# Patient Record
Sex: MALE | Race: WHITE | Employment: OTHER | URBAN - METROPOLITAN AREA
[De-identification: names, ages, dates, MRNs, and addresses within clinical notes are randomized per-mention and may not be internally consistent; named-entity substitution may affect disease eponyms.]

---

## 2022-03-03 RX ORDER — VALSARTAN AND HYDROCHLOROTHIAZIDE 80; 12.5 MG/1; MG/1
1 TABLET, FILM COATED ORAL DAILY
COMMUNITY

## 2022-03-03 NOTE — PROGRESS NOTES
Covid testing to be done @ 3/11 at [place]- pt will bring in [place]  If positive---Pt instructed to notify MD ASAP   If negative--pt was instructed to bring results DOP/DOSPreoperative Screening for Elective Surgery/Invasive Procedures While COVID-19 present in the community     1. Have you tested positive or have been told to self-isolate for COVID-19 like symptoms within the past 28 days?no  2. Do you currently have any of the following symptoms?no  ? Fever >100.0 F or 99.9 F in immunocompromised patients?n  ? New onset cough, shortness of breath or difficulty breathing?n  ? New onset sore throat, myalgia (muscle aches and pains), headache, loss of taste/smell or diarrhea?n  3. Have you had a potential exposure to COVID-19 within the past 14 days by:no  ? Close contact with a confirmed case?n  ? Close contact with a healthcare worker,  or essential infrastructure worker (grocery store, [employer], gas station, public utilities or transportation)?no  ? Do you reside in a congregate setting such as; skilled nursing facility, adult home, correctional facility, homeless shelter or other institutional setting?n  ? Have you had recent travel to a known COVID-19 hotspot?n     Indicate if the patient has a positive screen by answering yes to one or more of the above questions. If patient is unable to obtain a COVID test prior to DOS/DOP will need RAPID DOS. C-Difficile admission screening and protocol:     * Admitted with diarrhea?n     *Prior history of C-Diff. In last 3 months? n     *Antibiotic use in the past 6-8 weeks?n     *Prior hospitalization or nursing home in the last month? n    SAFET[facility] FIRST. .call before you fall  [facility] [facility] time_____920__        Surgery time____1050___    Take the following medications with a sip of water:    Do not eat or drink anything after 12:00 midnight prior to your surgery.   This includes water chewing gum, mints and ice chips.   You may brush your teeth and gargle the morning of your surgery, but do not swallow the water     Please see your family doctor/pediatrician for a history and physical and/or concerning medications. Bring any test results/reports from your physicians office. If you are under the care of a heart doctor or specialist doctor, please be aware that you may be asked to them for clearance    You may be asked to stop blood thinners such as Coumadin, Plavix, Fragmin, Lovenox, etc., or any anti-inflammatories such as:  Aspirin, Ibuprofen, Advil, Naproxen prior to your surgery. We also ask that you stop any OTC medications such as fish oil, vitamin E, glucosamine, garlic, Multivitamins, COQ 10, etc.    We ask that you do not smoke 24 hours prior to surgery  We ask that you do not  drink any alcoholic beverages 24 hours prior to surgery     You must make arrangements for a responsible adult to take you home after your surgery. For your safety you will not be allowed to leave alone or drive yourself home. Your surgery will be cancelled if you do not have a ride home. Also for your safety, it is strongly suggested that someone stay with you the first 24 hours after your surgery. A parent or legal guardian must accompany a child scheduled for surgery and plan to stay at the hospital until the child is discharged. Please do not bring other children with you. For your comfort, please wear simple loose fitting clothing to the hospital.  Please do not bring valuables. Do not wear any make-up or nail polish on your fingers or toes      For your safety, please do not wear any jewelry or body piercing's on the day of surgery. All jewelry must be removed. If you have dentures, they will be removed before going to operating room. For your convenience, we will provide you with a container. If you wear contact lenses or glasses, they will be removed, please bring a case for them.      If you have a living will and a durable power of  for healthcare, please bring in a copy. As part of our patient safety program to minimize surgical site infections, we ask you to do the following:    · Please notify your surgeon if you develop any illness between         now and the  day of your surgery. · This includes a cough, cold, fever, sore throat, nausea,         or vomiting, and diarrhea, etc.  ·  Please notify your surgeon if you experience dizziness, shortness         of breath or blurred vision between now and the time of your surgery. Do not shave your operative site 96 hours prior to surgery. For face and neck surgery, men may use an electric razor 48 hours   prior to surgery. You may shower the night before surgery or the morning of   your surgery with an antibacterial soap. You will need to bring a photo ID and insurance card    [facility] has an onsite pharmacy, would you like to utilize our pharmacy     If you will be staying overnight and use a C-pap machine, please bring   your C-pap to hospital     Our goal is to provide you with excellent care, therefore, visitors will be limited to two(2) in the room at a time so that we may focus on providing this care for you. Please contact pre-admission testing if you have any further questions. [facility] phone number:  [phone] PAT fax number:  [phone]  Please note these are generalized instructions for all surgical cases, you may be provided with more specific instructions according to your surgery.

## 2022-03-15 ENCOUNTER — ANESTHESIA EVENT (OUTPATIENT)
Dept: OPERATING ROOM | Age: 82
End: 2022-03-15
Payer: MEDICARE

## 2022-03-16 ENCOUNTER — HOSPITAL ENCOUNTER (OUTPATIENT)
Age: 82
Setting detail: OUTPATIENT SURGERY
Discharge: HOME | End: 2022-03-16
Attending: FAMILY MEDICINE | Admitting: FAMILY MEDICINE
Payer: MEDICARE

## 2022-03-16 ENCOUNTER — ANESTHESIA (OUTPATIENT)
Dept: OPERATING ROOM | Age: 82
End: 2022-03-16
Payer: MEDICARE

## 2022-03-16 VITALS
OXYGEN SATURATION: 95 % | DIASTOLIC BLOOD PRESSURE: 61 MMHG | RESPIRATION RATE: 1 BRPM | SYSTOLIC BLOOD PRESSURE: 121 MMHG

## 2022-03-16 VITALS
HEART RATE: 59 BPM | BODY MASS INDEX: 35.73 KG/M2 | HEIGHT: 76 IN | TEMPERATURE: 97.7 F | OXYGEN SATURATION: 98 % | SYSTOLIC BLOOD PRESSURE: 134 MMHG | RESPIRATION RATE: 16 BRPM | DIASTOLIC BLOOD PRESSURE: 72 MMHG | WEIGHT: 293.43 LBS

## 2022-03-16 DIAGNOSIS — C44.212: ICD-10-CM

## 2022-03-16 DIAGNOSIS — G89.18 POST-OPERATIVE PAIN: Primary | ICD-10-CM

## 2022-03-16 PROCEDURE — 6370000000 HC RX 637 (ALT 250 FOR IP): Performed by: FAMILY MEDICINE

## 2022-03-16 PROCEDURE — A4217 STERILE WATER/SALINE, 500 ML: HCPCS | Performed by: FAMILY MEDICINE

## 2022-03-16 PROCEDURE — 7100000000 HC PACU RECOVERY - FIRST 15 MIN: Performed by: FAMILY MEDICINE

## 2022-03-16 PROCEDURE — 2709999900 HC NON-CHARGEABLE SUPPLY: Performed by: FAMILY MEDICINE

## 2022-03-16 PROCEDURE — 88331 PATH CONSLTJ SURG 1 BLK 1SPC: CPT

## 2022-03-16 PROCEDURE — 2500000003 HC RX 250 WO HCPCS: Performed by: NURSE ANESTHETIST, CERTIFIED REGISTERED

## 2022-03-16 PROCEDURE — 7100000011 HC PHASE II RECOVERY - ADDTL 15 MIN: Performed by: FAMILY MEDICINE

## 2022-03-16 PROCEDURE — 3600000002 HC SURGERY LEVEL 2 BASE: Performed by: FAMILY MEDICINE

## 2022-03-16 PROCEDURE — 2580000003 HC RX 258: Performed by: FAMILY MEDICINE

## 2022-03-16 PROCEDURE — 88305 TISSUE EXAM BY PATHOLOGIST: CPT

## 2022-03-16 PROCEDURE — 3700000000 HC ANESTHESIA ATTENDED CARE: Performed by: FAMILY MEDICINE

## 2022-03-16 PROCEDURE — 2580000003 HC RX 258: Performed by: NURSE ANESTHETIST, CERTIFIED REGISTERED

## 2022-03-16 PROCEDURE — 7100000001 HC PACU RECOVERY - ADDTL 15 MIN: Performed by: FAMILY MEDICINE

## 2022-03-16 PROCEDURE — 3700000001 HC ADD 15 MINUTES (ANESTHESIA): Performed by: FAMILY MEDICINE

## 2022-03-16 PROCEDURE — 2580000003 HC RX 258: Performed by: ANESTHESIOLOGY

## 2022-03-16 PROCEDURE — 6360000002 HC RX W HCPCS: Performed by: NURSE ANESTHETIST, CERTIFIED REGISTERED

## 2022-03-16 PROCEDURE — 2500000003 HC RX 250 WO HCPCS: Performed by: FAMILY MEDICINE

## 2022-03-16 PROCEDURE — 7100000010 HC PHASE II RECOVERY - FIRST 15 MIN: Performed by: FAMILY MEDICINE

## 2022-03-16 PROCEDURE — 3600000012 HC SURGERY LEVEL 2 ADDTL 15MIN: Performed by: FAMILY MEDICINE

## 2022-03-16 RX ORDER — SODIUM CHLORIDE 9 MG/ML
INJECTION, SOLUTION INTRAVENOUS CONTINUOUS PRN
Status: DISCONTINUED | OUTPATIENT
Start: 2022-03-16 | End: 2022-03-16 | Stop reason: SDUPTHER

## 2022-03-16 RX ORDER — MAGNESIUM HYDROXIDE 1200 MG/15ML
LIQUID ORAL CONTINUOUS PRN
Status: COMPLETED | OUTPATIENT
Start: 2022-03-16 | End: 2022-03-16

## 2022-03-16 RX ORDER — SODIUM CHLORIDE 0.9 % (FLUSH) 0.9 %
5-40 SYRINGE (ML) INJECTION EVERY 12 HOURS SCHEDULED
Status: DISCONTINUED | OUTPATIENT
Start: 2022-03-16 | End: 2022-03-16 | Stop reason: HOSPADM

## 2022-03-16 RX ORDER — ACETAMINOPHEN AND CODEINE PHOSPHATE 300; 30 MG/1; MG/1
1 TABLET ORAL EVERY 6 HOURS PRN
Qty: 5 TABLET | Refills: 0 | Status: SHIPPED | OUTPATIENT
Start: 2022-03-16 | End: 2022-03-18

## 2022-03-16 RX ORDER — ONDANSETRON 2 MG/ML
4 INJECTION INTRAMUSCULAR; INTRAVENOUS
Status: DISCONTINUED | OUTPATIENT
Start: 2022-03-16 | End: 2022-03-16 | Stop reason: HOSPADM

## 2022-03-16 RX ORDER — SODIUM CHLORIDE 0.9 % (FLUSH) 0.9 %
5-40 SYRINGE (ML) INJECTION PRN
Status: DISCONTINUED | OUTPATIENT
Start: 2022-03-16 | End: 2022-03-16 | Stop reason: HOSPADM

## 2022-03-16 RX ORDER — MEPERIDINE HYDROCHLORIDE 25 MG/ML
12.5 INJECTION INTRAMUSCULAR; INTRAVENOUS; SUBCUTANEOUS EVERY 5 MIN PRN
Status: DISCONTINUED | OUTPATIENT
Start: 2022-03-16 | End: 2022-03-16 | Stop reason: HOSPADM

## 2022-03-16 RX ORDER — LIDOCAINE HYDROCHLORIDE 20 MG/ML
INJECTION, SOLUTION EPIDURAL; INFILTRATION; INTRACAUDAL; PERINEURAL PRN
Status: DISCONTINUED | OUTPATIENT
Start: 2022-03-16 | End: 2022-03-16 | Stop reason: SDUPTHER

## 2022-03-16 RX ORDER — SODIUM CHLORIDE 9 MG/ML
25 INJECTION, SOLUTION INTRAVENOUS PRN
Status: DISCONTINUED | OUTPATIENT
Start: 2022-03-16 | End: 2022-03-16 | Stop reason: HOSPADM

## 2022-03-16 RX ORDER — PROPOFOL 10 MG/ML
INJECTION, EMULSION INTRAVENOUS CONTINUOUS PRN
Status: DISCONTINUED | OUTPATIENT
Start: 2022-03-16 | End: 2022-03-16 | Stop reason: SDUPTHER

## 2022-03-16 RX ORDER — PROPOFOL 10 MG/ML
INJECTION, EMULSION INTRAVENOUS PRN
Status: DISCONTINUED | OUTPATIENT
Start: 2022-03-16 | End: 2022-03-16 | Stop reason: SDUPTHER

## 2022-03-16 RX ORDER — ACETAMINOPHEN 500 MG
1000 TABLET ORAL ONCE
Status: COMPLETED | OUTPATIENT
Start: 2022-03-16 | End: 2022-03-16

## 2022-03-16 RX ORDER — BACITRACIN ZINC AND POLYMYXIN B SULFATE 500; 1000 [USP'U]/G; [USP'U]/G
OINTMENT TOPICAL
Status: COMPLETED | OUTPATIENT
Start: 2022-03-16 | End: 2022-03-16

## 2022-03-16 RX ORDER — SODIUM CHLORIDE 9 MG/ML
INJECTION, SOLUTION INTRAVENOUS CONTINUOUS
Status: DISCONTINUED | OUTPATIENT
Start: 2022-03-16 | End: 2022-03-16 | Stop reason: HOSPADM

## 2022-03-16 RX ORDER — PHENYLEPHRINE HCL IN 0.9% NACL 1 MG/10 ML
SYRINGE (ML) INTRAVENOUS PRN
Status: DISCONTINUED | OUTPATIENT
Start: 2022-03-16 | End: 2022-03-16 | Stop reason: SDUPTHER

## 2022-03-16 RX ADMIN — ACETAMINOPHEN 1000 MG: 500 TABLET ORAL at 10:22

## 2022-03-16 RX ADMIN — Medication 100 MCG: at 11:23

## 2022-03-16 RX ADMIN — SODIUM CHLORIDE: 9 INJECTION, SOLUTION INTRAVENOUS at 10:22

## 2022-03-16 RX ADMIN — PROPOFOL 100 MG: 10 INJECTION, EMULSION INTRAVENOUS at 10:40

## 2022-03-16 RX ADMIN — SODIUM CHLORIDE: 9 INJECTION, SOLUTION INTRAVENOUS at 10:33

## 2022-03-16 RX ADMIN — PROPOFOL 50 MG: 10 INJECTION, EMULSION INTRAVENOUS at 10:47

## 2022-03-16 RX ADMIN — Medication 100 MCG: at 11:12

## 2022-03-16 RX ADMIN — PROPOFOL 75 MCG/KG/MIN: 10 INJECTION, EMULSION INTRAVENOUS at 10:55

## 2022-03-16 RX ADMIN — LIDOCAINE HYDROCHLORIDE 100 MG: 20 INJECTION, SOLUTION EPIDURAL; INFILTRATION; INTRACAUDAL; PERINEURAL at 10:40

## 2022-03-16 ASSESSMENT — PULMONARY FUNCTION TESTS
PIF_VALUE: 0
PIF_VALUE: 1
PIF_VALUE: 1
PIF_VALUE: 0
PIF_VALUE: 0
PIF_VALUE: 1
PIF_VALUE: 0
PIF_VALUE: 1
PIF_VALUE: 0
PIF_VALUE: 0
PIF_VALUE: 1
PIF_VALUE: 0
PIF_VALUE: 1
PIF_VALUE: 0
PIF_VALUE: 1
PIF_VALUE: 1
PIF_VALUE: 0
PIF_VALUE: 1
PIF_VALUE: 0

## 2022-03-16 ASSESSMENT — PAIN SCALES - GENERAL
PAINLEVEL_OUTOF10: 0

## 2022-03-16 ASSESSMENT — ENCOUNTER SYMPTOMS: SHORTNESS OF BREATH: 0

## 2022-03-16 ASSESSMENT — PAIN - FUNCTIONAL ASSESSMENT: PAIN_FUNCTIONAL_ASSESSMENT: 0-10

## 2022-03-16 NOTE — H&P
Plastic Surgery Consult    Patient: Felipe An MRN: [id number]     YOB: 1940  Age: 78 y.o. Sex: male    Unit: [facility] OR Room/Bed: OR/NONE Location: [address] [place] [address]     Admitting Physician: Aminta Jackson     Primary Care Physician: Diane Garza DO, DO      Chief Complaint/HPI:   Biopsy proven basal cell skin cancer Right ear. Date of Consultation: 3/16/2022    Subjective:      Felipe An is a 78 y.o. male who was referred for evaluation and treatment of biopsy proven basal cell skin cancer Right ear. Patient Active Problem List    Diagnosis Date Noted    Chest pain 06/14/2014    Constipation 06/14/2014    Hypercholesterolemia 06/14/2014    Atypical chest pain 06/14/2014    Bradycardia, sinus 06/14/2014     History reviewed. No pertinent family history. Social History     Tobacco Use    Smoking status: Never Smoker    Smokeless tobacco: Never Used   Substance Use Topics    Alcohol use: Not Currently     Past Medical History:   Diagnosis Date    Cancer ([facility] [address]     skin and prostate    High cholesterol     [place] (hard of hearing)     bilateral hearing aids    Hypertension       Past Surgical History:   Procedure Laterality Date    EYE SURGERY      PROSTATE SURGERY        Prior to Admission medications    Medication Sig Start Date End Date Taking? Authorizing Provider   valsartan-hydroCHLOROthiazide (DIOVAN HCT) 80-12.5 MG per tablet Take 1 tablet by mouth daily   Yes Historical Provider, MD   aspirin 81 MG EC tablet Take 1 tablet by mouth daily. 6/15/14  Yes Sammy Benjamin MD   atorvastatin (LIPITOR) 10 MG tablet Take 10 mg by mouth daily. Yes Historical Provider, MD     No Known Allergies     Review of Systems:   Pertinent items are noted in HPI. Objective:       Vital signs shows that the patient is afebrile and his vital signs are stable.      Scheduled Meds:   acetaminophen  1,000 mg Oral Once    sodium chloride flush  5-40 mL IntraVENous 2 times per day    sodium chloride flush  5-40 mL IntraVENous 2 times per day     Continuous Infusions:   sodium chloride      sodium chloride      sodium chloride       PRN Meds:sodium chloride flush, sodium chloride, sodium chloride flush, sodium chloride, meperidine, HYDROmorphone, HYDROmorphone, ondansetron          Physical Exam:   Constitutional: alert, no acute distress, well hydrated, well developed, well nourished. well groomed appropriate for age  Skin: normal color, no rashes, no unusual bruising. Palpation of scalp skin and inspection of body hair show no clinically significant lesions. Inspection and/or palpation of skin and subcutaneous tissues of head shows biopsy proven basal cell skin cancer Right ear, otherwise no clinically significant lesions. Inspection and/or or palpation of skin and subcutaneous tissues of neck shows no clinically significant lesions. Inspection and/or palpation of skin and subcutaneous tissues of abdomen shows no clinically significant lesions. Inspection and/or palpation of skin and subcutaneous tissues of genitalia, groin, and buttocks declined by patient. Inspection and/or palpation of skin and subcutaneous tissues of back shows no clinically significant lesions. Inspection and/or palpation of skin and subcutaneous tissues of chest shows no clinically significant lesions. Inspection and/or palpation of skin and subcutaneous tissues of RIGHT upper extremity shows no clinically significant lesions. Inspection and/orpalpation of skin and subcutaneous tissues of LEFT upper extremity shows no clinically significant lesions. Inspection and/or palpation of skin and subcutaneous tissues of RIGHT lower extremity shows no clinically significant lesions. Inspection and/or palpation of skin and subcutaneous tissues of LEFT lower extremity shows no clinically significant lesions.   Apocrine and eccrine sweat gland regions normal with no evidence of hyperhidrosis, chromhidrosis, nor bromhidrosis. No lymphadenopathy in bilateral cervical nor axillary lymph node basins. Skin normal turgor, normal capillary refill, and warm to touch. No cyanosis, clubbing, inflammation,  ischemia, infections, nodes, nor petechiae in digits nor nail beds in four extremties. Eyes: pupils equal, no injection, no icterus. conjunctiva are moist and clear bilaterally, normal lid motility bilaterally, extraoccular muscles intact bilaterally, pupils equal, round & reactive to light bilaterally, normal light reaction, and vision grossly normal  Ears/Nose/Throat: external ears normal, hearing normal, external nose normal, tongue normal. maxillary and mandibular teeth normal upper and lower lips normal maxillary and mandibular gums normal mucous membranes normal hard and soft palates normal tonsils normal posterior pharynx normal.  Neck: Supple, no adenopathy, no masses, no thyromegaly, no thyroid tenderness, nor nodules. Chest: normal chest inspection. Peripheral edema is present in the bilateral lower extremities. Varicose veins in bilateral legs. Pulses intact in four extremities. Skin warm. Abdomen: Anal and genitourinary exams declined by patient. No masses nor organomegaly. Neuro: cranial nerves grossly intact, sensation intact bilaterally. Psych: affect and mood appropriate. normal interaction. oriented to person, place time, and circumstance good eye contact. Assessment:     Active Problems:    * No active hospital problems. *  Resolved Problems:    * No resolved hospital problems. *        Plan:     Excision of biopsy proven basal cell skin cancer Right ear, out-patient, [facility], [facility]  Informed operative consent obtained. Follow up in my office one week post-op. Medical Decision Making:  High in severity, high complexity, with decision for surgical versus non-surgical therapy and anesthetic considerations. Records were reviewed.  Morbidity and mortality risk is high severity. Counseling: Diagnostic results show prognosis is good. Instructions for Management: Management of chronic diseases by [facility]  Excision of biopsy proven basal cell skin cancer Right ear, out-patient, [facility]est[facility], [facility]  Informed operative consent obtained. Follow up in my office one week post-op. Nature of Presenting Problem: High severity.           Tanmay Cisneros MD                        Signed By: Tanmay Cisneros MD     March 16, 2022

## 2022-03-16 NOTE — PROGRESS NOTES
Patient admitted to PACU # 4 from OR at 1210 post WEDGE RESECTION BIOPSY PROVEN BASAL CELL SKIN CANCER RIGHT EAR - Right per Dr. Author Waters. Attached to PACU monitoring system and report received from anesthesia provider. Patient was reported to be hemodynamically stable during procedure. Patient drowsy on admission and denied pain.       Electronically signed by Diaz Hurt RN on 3/16/2022 at 12:18 PM

## 2022-03-16 NOTE — ANESTHESIA PRE PROCEDURE
[facility] Department of Anesthesiology  Pre-Anesthesia Evaluation/Consultation       Name:  Venu Gerard  : 1940  Age:  78 y.o. MRN:  [id number]  Date: 3/16/2022           Surgeon: Surgeon(s):  Juan Carlos Lund MD    Procedure: Procedure(s):  WEDGE RESECTION BIOPSY PROVEN BASAL CELL SKIN CANCER RIGHT EAR     No Known Allergies  Patient Active Problem List   Diagnosis    Chest pain    Constipation    Hypercholesterolemia    Atypical chest pain    Bradycardia, sinus     Past Medical History:   Diagnosis Date    Cancer ([facility] [address]     skin and prostate    High cholesterol     [place] (hard of hearing)     bilateral hearing aids    Hypertension      Past Surgical History:   Procedure Laterality Date    EYE SURGERY      PROSTATE SURGERY       Social History     Tobacco Use    Smoking status: Never Smoker    Smokeless tobacco: Never Used   Vaping Use    Vaping Use: Never used   Substance Use Topics    Alcohol use: Not Currently    Drug use: Not Currently     Medications  No current facility-administered medications on file prior to encounter. Current Outpatient Medications on File Prior to Encounter   Medication Sig Dispense Refill    valsartan-hydroCHLOROthiazide (DIOVAN HCT) 80-12.5 MG per tablet Take 1 tablet by mouth daily      aspirin 81 MG EC tablet Take 1 tablet by mouth daily. 30 tablet 3    atorvastatin (LIPITOR) 10 MG tablet Take 10 mg by mouth daily.        Current Facility-Administered Medications   Medication Dose Route Frequency Provider Last Rate Last Admin    acetaminophen (TYLENOL) tablet 1,000 mg  1,000 mg Oral Once Rank Hayden Morris MD        0.9 % sodium chloride infusion   IntraVENous Continuous Tanmay Irby MD        sodium chloride flush 0.9 % injection 5-40 mL  5-40 mL IntraVENous 2 times per day Tanmay Irby MD        sodium chloride flush 0.9 % injection 5-40 mL  5-40 mL IntraVENous PRN Tanmay Irby MD  0.9 % sodium chloride infusion  25 mL IntraVENous PRN Pritesh Hall MD         Vital Signs (Current)   Vitals:    22 0928 22   BP:  132/67   Pulse:  69   Resp:  16   Temp:  97.9 °F (36.6 °C)   TempSrc:  Temporal   SpO2:  99%   Weight: 271 lb (122.9 kg) 293 lb 6.9 oz (133.1 kg)   Height: 6' 4\" (1.93 m) 6' 4\" (1.93 m)                                            Vital Signs Statistics (for past 48 hrs)     Temp  Av.9 °F (36.6 °C)  Min: 97.9 °F (36.6 °C)   Min taken time: 22  Max: 97.9 °F (36.6 °C)   Max taken time: 22  Pulse  Av  Min: 71   Min taken time: 22  Max: 71   Max taken time: 22  Resp  Av  Min: 12   Min taken time: 22  Max: 12   Max taken time: 22  BP  Min: 132/67   Min taken time: 22  Max: 132/67   Max taken time: 22  SpO2  Av %  Min: 99 %   Min taken time: 22  Max: 99 %   Max taken time: 22  BP Readings from Last 3 Encounters:   22 132/67       BMI  Body mass index is 35.72 kg/m². Estimated body mass index is 35.72 kg/m² as calculated from the following:    Height as of this encounter: 6' 4\" (1.93 m). Weight as of this encounter: 293 lb 6.9 oz (133.1 kg).     CBC   Lab Results   Component Value Date    WBC 5.1 2014    RBC 4.37 2014    HGB 13.9 2014    HCT 40.5 2014    MCV 92.5 2014    RDW 13.6 2014     2014     CMP    Lab Results   Component Value Date     2014    K 4.2 2014     2014    CO2 27 2014    BUN 16 2014    CREATININE 0.7 2014    GFRAA >60 2014    LABGLOM >60 2014    GLUCOSE 112 2014    CALCIUM 9.1 2014     BMP    Lab Results   Component Value Date     2014    K 4.2 2014     2014    CO2 27 2014    BUN 16 2014    CREATININE 0.7 2014    CALCIUM 9.1 2014    GFRAA >60 06/14/2014    LABGLOM >60 06/14/2014    GLUCOSE 112 06/14/2014     POCGlucose  No results for input(s): GLUCOSE in the last 72 hours. [facility]    Lab Results   Component Value Date    PROTIME 10.3 06/14/2014    INR 0.92 06/14/2014    APTT 30.9 52/75/5766     HCG (If Applicable) No results found for: PREGTESTUR, PREGSERUM, HCG, HCGQUANT   ABGs No results found for: PHART, PO2ART, MYQ8HQI, UNA4SBO, BEART, X6IFZGUI   Type & Screen (If Applicable)  No results found for: LABABO, LABRH                         BMI: Wt Readings from Last 3 Encounters:       NPO Status:   Date of last liquid consumption: 03/15/22   Time of last liquid consumption: 2300   Date of last solid food consumption: 03/15/22      Time of last solid consumption: 2000       Anesthesia Evaluation  Patient summary reviewed and Nursing notes reviewed  Airway: Mallampati: III  TM distance: >3 FB   Neck ROM: full  Mouth opening: > = 3 FB Dental:          Pulmonary:       (-) COPD, asthma and shortness of breath                           Cardiovascular:    (+) hypertension:, hyperlipidemia    (-) valvular problems/murmurs, past MI, CAD, CABG/stent, dysrhythmias and  angina                Neuro/Psych:      (-) seizures, TIA and CVA           GI/Hepatic/Renal:        (-) GERD, PUD, liver disease and no renal disease       Endo/Other:    (+) malignancy/cancer (prostate, skin). (-) diabetes mellitus               Abdominal:             Vascular: negative vascular ROS. Other Findings:             Anesthesia Plan      MAC     ASA 2     (I discussed intravenous sedation to the patient's satisfaction including risks and alternatives. The patient agreed with the plan and has no further questions. Mai Watson MD )  Induction: intravenous. Anesthetic plan and risks discussed with patient. Plan discussed with CRNA.                   This pre-anesthesia assessment may be used as a history and physical.    DOS STAFF ADDENDUM:    Pt seen and examined, chart reviewed (including anesthesia, drug and allergy history). No interval changes to history and physical examination. Anesthetic plan, risks, benefits, alternatives, and personnel involved discussed with patient. Patient verbalized an understanding and agrees to proceed.       Huey Anne MD  March 16, 2022  10:06 AM

## 2022-03-16 NOTE — PROGRESS NOTES
PACU Transfer Note    Vitals:    03/16/22 1245   BP: 134/72   Pulse: 55   Resp: 13   Temp: 97.7 °F (36.5 °C)   SpO2: 97%       No intake/output data recorded.     Pain assessment:  none  Pain Level: 0    Report given to Receiving unit RN.    3/16/2022 12:46 PM

## 2022-03-16 NOTE — ANESTHESIA POSTPROCEDURE EVALUATION
[facility] Department of Anesthesiology  Post-Anesthesia Note       Name:  Ruben Tavares                                  Age:  78 y.o. MRN:  [id number]     Last Vitals & Oxygen Saturation: /72   Pulse 59   Temp 97.7 °F (36.5 °C) (Temporal)   Resp 16   Ht 6' 4\" (1.93 m)   Wt 293 lb 6.9 oz (133.1 kg)   SpO2 98%   BMI 35.72 kg/m²   Patient Vitals for the past 4 hrs:   BP Temp Temp src Pulse Resp SpO2   03/16/22 1259 134/72 97.7 °F (36.5 °C) Temporal 59 16 98 %   03/16/22 1245 134/72 97.7 °F (36.5 °C)  55 13 97 %   03/16/22 1230 134/71   53 11 97 %   03/16/22 1225 120/67   60 15 95 %   03/16/22 1220 131/76   63 14 95 %   03/16/22 1215 127/73   64 15 94 %   03/16/22 1214    62     03/16/22 1211 116/62 97.5 °F (36.4 °C) Tympanic 64 14        Level of consciousness:  Awake, alert    Respiratory: Respirations easy, no distress. Stable. Cardiovascular: Hemodynamically stable. Hydration: Adequate. PONV: Adequately managed. Post-op pain: Adequately controlled. Post-op assessment: Tolerated anesthetic well without complication. Complications:  None.     Susana Garg MD  March 16, 2022   3:00 PM

## 2022-03-16 NOTE — H&P
Date of Surgery Update:  Marissa Jasso was seen, history and physical examination reviewed, and patient examined by me today. Site for surgery is marked, including my initials, NOAH. There have been no significant clinical changes since the completion of the above history and physical. The risk, benefits, and alternatives of the proposed procedure have been explained to the patient (or appropriate guardian) and understanding verbalized. All questions answered. Patient wishes to proceed.       Signed By: Lynne Castelan MD     March 16, 2022 10:18 AM

## 2022-03-16 NOTE — BRIEF OP NOTE
Brief Postoperative Note      Patient: Shad Patino  Date of Birth: [birth year]  MRN: [id number]    Date of Procedure: 3/16/2022    Pre-Op Diagnosis: BIOPSY PROVEN BASAL CELL SKIN CANCER RIGHT EAR2    Post-Op Diagnosis: Same       Procedure(s):  WEDGE RESECTION BIOPSY PROVEN BASAL CELL SKIN CANCER RIGHT EAR , 3.5 cm  Surgeon(s):  Cal Abreu MD    Assistant:  Surgical Assistant: Lukasz Crane    Anesthesia: Monitor Anesthesia Care    Estimated Blood Loss (mL): les than 10 ml    Complications: None    Specimens:   ID Type Source Tests Collected by Time Destination   A : A) PROVEN BASAL CELL SKIN CANCER RIGHT EAR  Specimen Ear SURGICAL PATHOLOGY Cal Abreu MD 3/16/2022 1020        Implants:  * No implants in log *      Drains: * No LDAs found *    Findings: as above    Electronically signed by Yana Middleton MD on 3/16/2022 at 12:04 PM

## 2022-03-17 NOTE — OP NOTE
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 with  frozen section and pathology; description of the risks and benefits of  the planned surgery and anesthesia including, but not limited to,  anaphylactic shock; administration of blood and/or blood products with  risk of blood-borne diseases; hypotension; injury to adjacent vital  structures including numbness of the skin in the area of the incision;  deep venous thrombosis of the legs with increased risk due to  hypertension, hyperlipidemia, and aspirin therapy; pulmonary emboli with  increased risk due to the same list of comorbidities; partial versus  complete graft or flap loss with increased risk due to the same list of  comorbidities; heart attack with increased risk due to the same list of  comorbidities; coma with increased risk due to the same list of  comorbidities; stroke with increased risk due to the same list of  comorbidities; paralysis with increased risk due to the same list of  comorbidities; weakness with increased risk due to the same list of  comorbidities; infection with increased risk due to the same list of  comorbidities; bleeding with increased risk due to the same list of  comorbidities; hematoma formation with increased risk due to the same  list of comorbidities; wound dehiscence with increased risk due to the  same list of comorbidities; death with increased risk due to the same  list of comorbidities; subtle asymmetry; hepatitis; renal failure; lung  failure; scar hypertrophy; keloid formation; fibrosis; pain for which  medication is available; risks associated with the use of a tourniquet.    All of the above risks are increased by poor health circumstances such  as obesity, malnutrition, smoking, and other forms of tobacco use;  description of alternatives to the planned surgery and anesthesia  including, but not limited to, local infiltration alone, sedation alone,  regional block with tumescent anesthesia, and general anesthesia. Surgical alternatives include radiation therapy, Mohs' chemographic  surgery, multiple excision options; description of the consequences of  no treatment to the problem including, but not limited to, cancer will  grow, spread, invade, and kill the patient; and statement as to the  probability of successful outcome of planned surgery and anesthesia  barring occurrence of major complications being greater than 85%. The  patient was then afforded the opportunity to ask questions. All  questions were answered and the patient understood the answers to the  questions and informed operative consent. On the day of surgery, the patient was taken to the operating room and  placed on the operating table in supine position. The site for surgery  had been marked preoperatively per the surgeon with a wedge designed  around the biopsy-proven basal cell cancer of the right ear helical rim. Surgeon's initials were placed within the skin paddle to be excised. The patient underwent sedation anesthesia, and then the right ear and  the side of the head were prepared with iodine-containing surgical scrub  and paint solution after completely dry and the area was draped in a  routine sterile fashion. Under 4.5 power loupe magnification, a #10 scalpel blade and a sterile  tongue depressor were used to amputate the wedge from the ear. It was  marked with a marking suture at the anterior-superior helical rim and  submitted to Pathology for frozen section examination for margin  control. Hemostasis was achieved with Malis bipolar cautery. Frozen section report revealed all margins and depths were clear of  tumor involvement. The anterior helical rim was then realigned with a vertical mattress  suture of 5-0 chromic.   The divided edges of the cartilage were then  realigned with several simple sutures of 5-0 chromic. The anterior helical skin was then reapproximated with a combination of  simple and vertical mattress sutures of 5-0 chromic. The posterior skin  was reapproximated with a combination of simple and vertical mattress  sutures of 5-0 chromic as well. The skin was washed, dried, and dressed with antibiotic ointment. The patient was awakened from sedation anesthesia and discharged to the  postanesthesia care unit, then to ambulatory surgery center, and then to  home in stable condition, having tolerated the surgery and anesthesia  well. The patient is to follow light activities, eat a regular diet,  and be seen in followup in my office in one week. He is to take Tylenol  No. 3 for pain. He may take Advil as indicated and tolerated. The temperature on entering and exiting the room was 70 degrees. The patient's VTE risk was high. The patient did not require chemical  prophylaxis in the operating room today. The patient did receive  mechanical prophylaxis with bilateral sequential compression devices on  the legs, a pillow beneath the knees, and the operating room table was  head down 5 degrees in position.         Elizabeth Cheney MD    D: 03/17/2022 9:13:15       T: 03/17/2022 13:37:27     [id number]  Job#: [id number]     Doc#: [id number]    CC:  Nael Heart

## (undated) DEVICE — GOWN SIRUS NONREIN LG W/TWL: Brand: MEDLINE INDUSTRIES, INC.

## (undated) DEVICE — NEEDLE HYPO 25GA L1.5IN BVL ORIENTED ECLIPSE

## (undated) DEVICE — GLOVE SURG SZ 75 CRM LTX FREE POLYISOPRENE POLYMER BEAD ANTI

## (undated) DEVICE — CANISTER, RIGID, 1200CC: Brand: MEDLINE INDUSTRIES, INC.

## (undated) DEVICE — SOLUTION IV IRRIG POUR BRL 0.9% SODIUM CHL 2F7124

## (undated) DEVICE — SOLUTION PREP POVIDONE IOD FOR SKIN MUCOUS MEM PRIOR TO

## (undated) DEVICE — SUTURE MCRYL + SZ 5 0 L18IN ABSRB UD L13MM P 3 3 8 CIR PRIM MCP493G

## (undated) DEVICE — NEEDLE HYPO 18GA L1.5IN THN WALL PIVOTING SHLD BVL ORIENTED

## (undated) DEVICE — STERILE COTTON BALLS LARGE 5/P: Brand: MEDLINE

## (undated) DEVICE — Z DISCONTINUED USE 2272117 DRAPE SURG 3 QTR N INVASIVE 2 LAYR DISP

## (undated) DEVICE — DRAPE,SPLIT ,77X120: Brand: MEDLINE

## (undated) DEVICE — SYRINGE MED 10ML LUERLOCK TIP W/O SFTY DISP

## (undated) DEVICE — MINOR SET UP: Brand: MEDLINE INDUSTRIES, INC.

## (undated) DEVICE — INTENDED FOR TISSUE SEPARATION, AND OTHER PROCEDURES THAT REQUIRE A SHARP SURGICAL BLADE TO PUNCTURE OR CUT.: Brand: BARD-PARKER ® STAINLESS STEEL BLADES